# Patient Record
Sex: FEMALE | Race: BLACK OR AFRICAN AMERICAN
[De-identification: names, ages, dates, MRNs, and addresses within clinical notes are randomized per-mention and may not be internally consistent; named-entity substitution may affect disease eponyms.]

---

## 2018-05-05 ENCOUNTER — HOSPITAL ENCOUNTER (EMERGENCY)
Dept: HOSPITAL 76 - ED | Age: 33
Discharge: HOME | End: 2018-05-05
Payer: COMMERCIAL

## 2018-05-05 VITALS — DIASTOLIC BLOOD PRESSURE: 73 MMHG | SYSTOLIC BLOOD PRESSURE: 133 MMHG

## 2018-05-05 DIAGNOSIS — F17.200: ICD-10-CM

## 2018-05-05 DIAGNOSIS — J03.90: Primary | ICD-10-CM

## 2018-05-05 PROCEDURE — 87430 STREP A AG IA: CPT

## 2018-05-05 PROCEDURE — 87070 CULTURE OTHR SPECIMN AEROBIC: CPT

## 2018-05-05 PROCEDURE — 99283 EMERGENCY DEPT VISIT LOW MDM: CPT

## 2018-05-05 NOTE — ED PHYSICIAN DOCUMENTATION
PD HPI HEENT





- Stated complaint


Stated Complaint: THROAT PX





- Chief complaint


Chief Complaint: Heent





- History obtained from


History obtained from: Patient





- History of Present Illness


Timing - onset: How many weeks ago (2)


Timing - duration: Weeks (2)


Timing - details: Gradual onset, Still present


Location: Throat


Improves: Medication


Worsens: Swalllowing


Associated symptoms: Congestion, Rhinorrhea, Headache, Cough


Similar symptoms before: Diagnosis (pharyngitis)


Recently seen: Not recently seen





- Additional information


Additional information: 





32-year-old previously well female is developed a cough congestion she had what 

she considered a regular head cold and this somewhat improved but now has a 

sore throat especially on the right side that is worse this morning.





Review of Systems


Constitutional: denies: Fever


Eyes: denies: Decreased vision


Ears: denies: Ear pain


Nose: reports: Congestion.  denies: Rhinorrhea / runny nose


Throat: reports: Sore throat


Cardiac: denies: Chest pain / pressure, Palpitations


Respiratory: reports: Cough.  denies: Dyspnea


GI: denies: Abdominal Pain, Nausea, Vomiting


: denies: Dysuria, Frequency





PD PAST MEDICAL HISTORY





- Past Surgical History


Past Surgical History: No





- Present Medications


Home Medications: 


 Ambulatory Orders











 Medication  Instructions  Recorded  Confirmed


 


Azithromycin [Zithromax] 250 mg PO DAILY #6 tablet 05/05/18 


 


Ethinyl Estradiol/Drospirenone  05/05/18 





[Stephanie 28 Tablet]   














- Allergies


Allergies/Adverse Reactions: 


 Allergies











Allergy/AdvReac Type Severity Reaction Status Date / Time


 


No Known Drug Allergies Allergy   Verified 12/31/16 15:22














- Social History


Does the pt smoke?: Yes


Smoking Status: Current every day smoker


Does the pt drink ETOH?: Yes


Does the pt have substance abuse?: No





- Immunizations


Immunizations are current?: Yes





PD ED PE NORMAL





- Vitals


Vital signs reviewed: Yes (normal )





- General


General: Alert and oriented X 3, No acute distress, Well developed/nourished





- HEENT


HEENT: Atraumatic, PERRL, EOMI, Ears normal, Other (The pharynx is with 1+ 

tonsils with exudate worse on the right. )





- Neck


Neck: Supple, no meningeal sign, No bony TTP





- Cardiac


Cardiac: RRR, No murmur





- Respiratory


Respiratory: No respiratory distress, Clear bilaterally





- Abdomen


Abdomen: Soft, Non tender





- Back


Back: No CVA TTP, No spinal TTP





- Derm


Derm: Normal color, Warm and dry, No rash





- Extremities


Extremities: No deformity, No edema





- Neuro


Neuro: No motor deficit, No sensory deficit


Eye Opening: Spontaneous


Motor: Obeys Commands


Verbal: Oriented


GCS Score: 15





- Psych


Psych: Normal mood, Normal affect





Results





- Vitals


Vitals: 





 Vital Signs - 24 hr











  05/05/18





  08:57


 


Temperature 36.1 C L


 


Heart Rate 91


 


Respiratory 15





Rate 


 


Blood Pressure 129/82 H


 


O2 Saturation 99








 Oxygen











O2 Source                      Room air

















PD MEDICAL DECISION MAKING





- ED course


Complexity details: reviewed old records, reviewed results, re-evaluated patient

, considered differential, d/w patient


ED course: 





32-year-old female with persistent sore throat has exudative tonsillitis and is 

administered dexamethasone 10 mg orally we will place her on some azithromycin.





Departure





- Departure


Disposition: 01 Home, Self Care


Clinical Impression: 


Acute tonsillitis


Qualifiers:


 Pharyngitis/tonsillitis etiology: unspecified etiology Qualified Code(s): 

J03.90 - Acute tonsillitis, unspecified





Condition: Stable


Instructions:  ED Tonsillitis


Follow-Up: 


Jemima López ARNP [Primary Care Provider] - 


Prescriptions: 


Azithromycin [Zithromax] 250 mg PO DAILY #6 tablet